# Patient Record
Sex: FEMALE | Race: WHITE
[De-identification: names, ages, dates, MRNs, and addresses within clinical notes are randomized per-mention and may not be internally consistent; named-entity substitution may affect disease eponyms.]

---

## 2017-07-08 ENCOUNTER — HOSPITAL ENCOUNTER (EMERGENCY)
Dept: HOSPITAL 25 - UCCORT | Age: 47
Discharge: HOME | End: 2017-07-08
Payer: COMMERCIAL

## 2017-07-08 VITALS — SYSTOLIC BLOOD PRESSURE: 147 MMHG | DIASTOLIC BLOOD PRESSURE: 87 MMHG

## 2017-07-08 DIAGNOSIS — I10: ICD-10-CM

## 2017-07-08 DIAGNOSIS — J02.9: Primary | ICD-10-CM

## 2017-07-08 DIAGNOSIS — L72.0: ICD-10-CM

## 2017-07-08 PROCEDURE — G0463 HOSPITAL OUTPT CLINIC VISIT: HCPCS

## 2017-07-08 PROCEDURE — 99211 OFF/OP EST MAY X REQ PHY/QHP: CPT

## 2017-07-08 NOTE — UC
Throat Pain/Nasal Rubén HPI





- History of Current Complaint


Chief Complaint: Liu


Stated Complaint: SORE THROAT/LUMP ON NECK


Time Seen by Provider: 07/08/17 14:37


Hx Obtained From: Patient


Hx Last Menstrual Period: 8/10/15


Onset/Duration: Sudden Onset - choking on food 2 days ago. Has a sore throat. 

Also has a lump on the right side of the neck., Still Present - still mild sore 

throat.


Severity: Mild


Cough: None


Associated Signs & Symptoms: Positive: Dysphagia, Hoarseness.  Negative: 

Wheezing, Fever


Related History: Seasonal Allergies





- Allergies/Home Medications


Allergies/Adverse Reactions: 


 Allergies











Allergy/AdvReac Type Severity Reaction Status Date / Time


 


No Known Allergies Allergy   Verified 07/08/17 14:39











Home Medications: 


 Home Medications





medroxyPROGESTERone TAB* [Provera TAB*] 10 mg PO SEE INSTRUCTIONS 07/08/17 [

History Confirmed 07/08/17]











PMH/Surg Hx/FS Hx/Imm Hx


Cardiovascular History: Hypertension





- Surgical History


Surgical History: Yes


Surgery Procedure, Year, and Place: d/c,deviated septum





- Family History


Known Family History: Positive: Cardiac Disease, Hypertension, Diabetes





- Social History


Occupation: Employed Full-time


Lives: With Family


Alcohol Use: None


Substance Use Type: None


Smoking Status (MU): Never Smoked Tobacco


Have You Smoked in the Last Year: No





- Immunization History


Most Recent Influenza Vaccination: none





Review of Systems


Skin: Other - skin nodule on the right neck.


ENT: Sore Throat


All Other Systems Reviewed And Are Negative: Yes





Physical Exam


Triage Information Reviewed: Yes


Appearance: Well-Appearing, No Pain Distress, Well-Nourished


Vital Signs: 


 Initial Vital Signs











Temp  98.8 F   07/08/17 14:36


 


Pulse  105   07/08/17 14:36


 


Resp  14   07/08/17 14:36


 


BP  147/87   07/08/17 14:36


 


Pulse Ox  99   07/08/17 14:36











Vital Signs Reviewed: Yes


Eyes: Positive: Conjunctiva Clear


ENT Exam: Normal


ENT: Positive: Nasal congestion - with allergic changes.


Neck exam: Normal


Respiratory Exam: Normal


Cardiovascular Exam: Normal


Musculoskeletal Exam: Normal


Neurological Exam: Normal


Psychological Exam: Normal


Skin: Positive: Other - 3mm cyst on the right anterior neck.





Throat Pain/Nasal Course/Dx





- Differential Dx/Diagnosis


Differential Diagnosis/HQI/PQRI: Mononucleosis, Tonsillitis, URI


Provider Diagnoses: Pharyngitis.  Skin cyst





Discharge





- Discharge Plan


Condition: Stable


Disposition: HOME


Patient Education Materials:  Pharyngitis (ED), Epidermal Inclusion Cysts (ED)


Additional Instructions: 


The cyst would only need to be addressed if it gets infected which will make it 

hot, red and swollen.


The sore throat from choking should gradually resolve. Soft diet.

## 2017-09-17 ENCOUNTER — HOSPITAL ENCOUNTER (EMERGENCY)
Dept: HOSPITAL 25 - UCEAST | Age: 47
Discharge: HOME | End: 2017-09-17
Payer: COMMERCIAL

## 2017-09-17 VITALS — SYSTOLIC BLOOD PRESSURE: 153 MMHG | DIASTOLIC BLOOD PRESSURE: 106 MMHG

## 2017-09-17 DIAGNOSIS — H57.8: Primary | ICD-10-CM

## 2017-09-17 PROCEDURE — 99212 OFFICE O/P EST SF 10 MIN: CPT

## 2017-09-17 PROCEDURE — G0463 HOSPITAL OUTPT CLINIC VISIT: HCPCS

## 2018-03-02 ENCOUNTER — HOSPITAL ENCOUNTER (EMERGENCY)
Dept: HOSPITAL 25 - UCEAST | Age: 48
Discharge: HOME | End: 2018-03-02
Payer: COMMERCIAL

## 2018-03-02 VITALS — DIASTOLIC BLOOD PRESSURE: 94 MMHG | SYSTOLIC BLOOD PRESSURE: 142 MMHG

## 2018-03-02 DIAGNOSIS — R03.0: ICD-10-CM

## 2018-03-02 DIAGNOSIS — J01.90: Primary | ICD-10-CM

## 2018-03-02 PROCEDURE — G0463 HOSPITAL OUTPT CLINIC VISIT: HCPCS

## 2018-03-02 PROCEDURE — 99212 OFFICE O/P EST SF 10 MIN: CPT

## 2018-03-02 NOTE — UC
Throat Pain/Nasal Rubén HPI





- HPI Summary


HPI Summary: 





Patient with complaints of worsening sinus congestion nasal drip pain in her 

face and in her nose has a history of deviated  septum with sinus surgery





- History of Current Complaint


Chief Complaint: UCGeneralIllness


Stated Complaint: SINUS AND CHEST CONGESTION


Time Seen by Provider: 03/02/18 14:35


Hx Obtained From: Patient


Hx Last Menstrual Period: 9/10/17


Pregnant?: No


Onset/Duration: Gradual Onset, Lasting Days - 3


Severity: Moderate


Pain Intensity: 2


Pain Scale Used: 0-10 Numeric


Cough: None - Right


Associated Signs & Symptoms: Positive: Sinus Discomfort, Nasal Discharge -  

adnexa Jongtom 3-year-old


Related History: Prior ENT Surgery





- Allergies/Home Medications


Allergies/Adverse Reactions: 


 Allergies











Allergy/AdvReac Type Severity Reaction Status Date / Time


 


No Known Allergies Allergy   Verified 03/02/18 14:19














PMH/Surg Hx/FS Hx/Imm Hx


Previously Healthy: Yes





- Surgical History


Surgical History: Yes


Surgery Procedure, Year, and Place: d/c.  deviated septum





- Family History


Known Family History: Positive: Cardiac Disease, Hypertension, Diabetes





- Social History


Occupation: Employed Full-time


Lives: With Family


Alcohol Use: None


Substance Use Type: None


Smoking Status (MU): Never Smoked Tobacco


Have You Smoked in the Last Year: No





- Immunization History


Most Recent Influenza Vaccination: none





Review of Systems


Constitutional: Negative - A couple of Trileptal


Skin: Negative


Eyes: Negative


ENT: Nasal Discharge, Sinus Congestion, Sinus Pain/Tenderness -  hopefully is


Respiratory: Negative


Cardiovascular: Negative


Gastrointestinal: Negative


Genitourinary: Negative


Motor: Negative


Neurovascular: Negative


Musculoskeletal: Negative


Neurological: Headache


Psychological: Negative


Is Patient Immunocompromised?: No


All Other Systems Reviewed And Are Negative: Yes





Physical Exam


Triage Information Reviewed: Yes


Appearance: Well-Appearing, No Pain Distress, Well-Nourished


Vital Signs: 


 Initial Vital Signs











Temp  98.7 F   03/02/18 14:21


 


Pulse  100   03/02/18 14:21


 


Resp  16   03/02/18 14:21


 


BP  142/94   03/02/18 14:21


 


Pulse Ox  99   03/02/18 14:21











Vital Signs Reviewed: Yes


Eye Exam: Normal


Eyes: Positive: Conjunctiva Clear


ENT Exam: Normal


ENT: Positive: Normal ENT inspection, Hearing grossly normal, Pharynx normal, 

Nasal congestion, Nasal drainage, TMs normal, Sinus tenderness, Uvula midline - 

O Any beta negative O timesa 100,001 uncomfortable culture will do a rapid 

strep here and.  Negative: Tonsillar swelling, Tonsillar exudate, Trismus, 

Muffled voice, Hoarse voice, Dental tenderness


Dental Exam: Normal


Neck exam: Normal


Neck: Positive: Supple, Nontender


Respiratory Exam: Normal


Respiratory: Positive: Chest non-tender, Lungs clear, Normal breath sounds, No 

respiratory distress, No accessory muscle use


Cardiovascular Exam: Normal


Cardiovascular: Positive: RRR, Pulses Normal, Brisk Capillary Refill


Musculoskeletal Exam: Normal


Musculoskeletal: Positive: Strength Intact, ROM Intact, No Edema


Neurological Exam: Normal


Neurological: Positive: Alert, Muscle Tone Normal


Psychological Exam: Normal


Skin Exam: Normal





Throat Pain/Nasal Course/Dx





- Course


Assessment/Plan: Patient states she started using over-the-counter remedies 

Sudafed and Mucinex patient states she is getting no relief patient states her 

PCP will usually Rx her Zithromax and she gets good effect with that she's 

hoping to have another sinus surgery patient requesting medication for cough at 

nighttime will Rx Robitussin and codeine blood pressures elevated without 

documented history of hypertension will follow with primary care in the next 1-

2 weeks





- Differential Dx/Diagnosis


Provider Diagnoses: Acute sinusitis elevated blood pressure without diagnosis 

of hypertension





Discharge





- Discharge Plan


Condition: Stable


Disposition: HOME


Prescriptions: 


Azithromycin TAB* [Zithromax TAB (Z-GROVER) 250 mg #6 tabs] 2 tab PO .TODAY, THEN 

1 DAILY #1 grover


guaiFENesin/CODIEN 100MG-10MG* [Robitussin AC 100Mg-10Mg*] 5 - 10 ml PO Q4H PRN 

#120 ml MDD 40


 PRN Reason: cough


Patient Education Materials:  Sinusitis (ED), Hypertension (ED)


Referrals: 


Jos Jesus MD [Primary Care Provider] - 2 Weeks

## 2018-04-30 ENCOUNTER — HOSPITAL ENCOUNTER (EMERGENCY)
Dept: HOSPITAL 25 - UCCORT | Age: 48
Discharge: LEFT BEFORE DISCHARGE | End: 2018-04-30
Payer: COMMERCIAL

## 2018-04-30 VITALS — DIASTOLIC BLOOD PRESSURE: 97 MMHG | SYSTOLIC BLOOD PRESSURE: 157 MMHG

## 2018-04-30 DIAGNOSIS — Z82.49: ICD-10-CM

## 2018-04-30 DIAGNOSIS — I10: ICD-10-CM

## 2018-04-30 DIAGNOSIS — R07.9: Primary | ICD-10-CM

## 2018-04-30 DIAGNOSIS — R06.02: ICD-10-CM

## 2018-04-30 PROCEDURE — G0463 HOSPITAL OUTPT CLINIC VISIT: HCPCS

## 2018-04-30 PROCEDURE — 99212 OFFICE O/P EST SF 10 MIN: CPT

## 2018-04-30 PROCEDURE — 93005 ELECTROCARDIOGRAM TRACING: CPT

## 2018-04-30 NOTE — ED
HPI Chest Pain





- HPI Summary


HPI Summary: 





48 yr old female with complaint of chest pain, sob, and left arm discomfort, 

tingling. Onset yesterday with the arm.  Today she feels SOB and has tightness 

in the chest.  She denies fever, chills, pleuritic pain.  She denies swelling 

or pain in the legs.  She denies dizziness, palpitations. 





She has a history of HTN, and family history of CAD. 





She has no other complaints.  





- History of Current Complaint


Chief Complaint: UCChestPain


Time Seen by Provider: 04/30/18 12:26


Hx Last Menstrual Period: 3/1/18


Pain Intensity: 3





- Allergy/Home Medications


Allergies/Adverse Reactions: 


 Allergies











Allergy/AdvReac Type Severity Reaction Status Date / Time


 


No Known Allergies Allergy   Verified 04/30/18 12:33














PMH/Surg Hx/FS Hx/Imm Hx


Endocrine/Hematology History: 


   Denies: Hx Diabetes, Hx Thyroid Disease


Cardiovascular History: Reports: Hx Hypertension - OCCASIONALLY HIGH BUT NO MEDS


   Denies: Hx Pacemaker/ICD


Respiratory History: 


   Denies: Hx Asthma, Hx Chronic Obstructive Pulmonary Disease (COPD)


GI History: 


   Denies: Hx Ulcer


 History: 


   Denies: Hx Renal Disease


Sensory History: 


   Denies: Hx Hearing Aid


Psychiatric History: 


   Denies: Hx Panic Disorder





- Cancer History


Hx Chemotherapy: No


Hx Radiation Therapy: No





- Surgical History


Surgery Procedure, Year, and Place: d/c.  deviated septum


Infectious Disease History: No


Infectious Disease History: 


   Denies: Hx Hepatitis, Hx Human Immunodeficiency Virus (HIV), Traveled 

Outside the [place] in Last 30 Days





- Family History


Known Family History: Positive: Cardiac Disease, Hypertension, Diabetes





- Social History


Alcohol Use: Rare


Substance Use Type: Reports: None


Smoking Status (MU): Never Smoked Tobacco


Have You Smoked in the Last Year: No





Review of Systems


Negative: Fever, Chills


Positive: Chest Pain


Positive: Shortness Of Breath


All Other Systems Reviewed And Are Negative: Yes





Physical Exam


Triage Information Reviewed: Yes


Vital Signs On Initial Exam: 


 Initial Vitals











Temp Pulse Resp BP Pulse Ox


 


 98.2 F   101   20   157/97   99 


 


 04/30/18 12:27  04/30/18 12:27  04/30/18 12:27  04/30/18 12:27  04/30/18 12:27











Vital Signs Reviewed: Yes


Appearance: Positive: Well-Appearing, No Pain Distress


Skin: Positive: Warm, Skin Color Reflects Adequate Perfusion


Head/Face: Positive: Normal Head/Face Inspection


Eyes: Positive: EOMI


ENT: Positive: Normal ENT inspection


Neck: Positive: Nontender


Respiratory/Lung Sounds: Positive: Clear to Auscultation, Breath Sounds Present


Cardiovascular: Positive: RRR.  Negative: Murmur


Abdomen Description: Positive: Nontender


Musculoskeletal: Positive: Strength/ROM Intact


Neurological: Positive: Sensory/Motor Intact, Alert, Oriented to Person Place, 

Time, CN Intact II-III


Psychiatric: Positive: Normal





- Eustace Coma Scale


Best Eye Response: 4 - Spontaneous


Best Motor Response: 6 - Obeys Commands


Best Verbal Response: 5 - Oriented


Coma Scale Total: 15





Diagnostics





- Vital Signs


 Vital Signs











  Temp Pulse Resp BP Pulse Ox


 


 04/30/18 12:27  98.2 F  101  20  157/97  99














- Laboratory


Lab Statement: Any lab studies that have been ordered have been reviewed, and 

results considered in the medical decision making process.





Chest Pain Course/Dx





- Course


Course Of Treatment: 48 yr old with chest pain, sob.  EKG ok.  I have advised 

her to go to the ER by ambulance for her symptoms.  She is signing out AMA with 

risk of heart attack, death and disability understood.





- Diagnoses


Provider Diagnoses: 


 Chest pain, Shortness of breath, Hypertension








Discharge





- Sign-Out/Discharge


Documenting (check all that apply): Discharge/Admit/Transfer





- Discharge Plan


Condition: Good


Disposition: AGAINST MEDICAL ADVICE


Referrals: 


Jos Jesus MD [Primary Care Provider] - 





- Billing Disposition and Condition


Condition: GOOD


Disposition: AMA

## 2018-11-30 ENCOUNTER — HOSPITAL ENCOUNTER (EMERGENCY)
Dept: HOSPITAL 25 - UCEAST | Age: 48
Discharge: HOME | End: 2018-11-30
Payer: COMMERCIAL

## 2018-11-30 VITALS — DIASTOLIC BLOOD PRESSURE: 90 MMHG | SYSTOLIC BLOOD PRESSURE: 170 MMHG

## 2018-11-30 DIAGNOSIS — H10.31: ICD-10-CM

## 2018-11-30 DIAGNOSIS — J32.9: Primary | ICD-10-CM

## 2018-11-30 DIAGNOSIS — I10: ICD-10-CM

## 2018-11-30 PROCEDURE — 99212 OFFICE O/P EST SF 10 MIN: CPT

## 2018-11-30 PROCEDURE — G0463 HOSPITAL OUTPT CLINIC VISIT: HCPCS

## 2018-11-30 NOTE — UC
Respiratory Complaint HPI





- HPI Summary


HPI Summary: 





49 y/o female presents to the urgent care c/o nasal congestion, green nasal 

discharge, a lot of PND for the past 10 days. Pt states Hx of recurrent 

sinusitis in the past. She saw her PCP on 11/27/2018 and was Rx Z-emelia which 

usually resolves it. However this time symptoms are worsening. She woke uo this 

morning w/ her RT eye red and some mild crusting discharge. the dry cough is 

not allowing her to sleep well since she  has been mouth breathing too. She 

took last dose ot ABx today. Pt denies fever, SOB, chest pain, HA, dizziness, 

abdominal pain, N/V/D. She felt some mild wheezing last night, which resolved 

today. 








- History of Current Complaint


Chief Complaint: UCRespiratory


Stated Complaint: SINUS CONGESTION


Time Seen by Provider: 11/30/18 20:11


Hx Obtained From: Patient


Hx Last Menstrual Period: 1 MONTH AGO


Pregnant?: No


Onset/Duration: Gradual Onset, Lasting Weeks - 1 week, Still Present, Worse 

Since - 2 days


Timing: Intermittent Episodes


Severity Initially: Mild


Severity Currently: Moderate


Pain Intensity: 7 - sinus pain


Pain Scale Used: 0-10 Numeric


Character: Cough: Nonproductive


Aggravating Factors: Recumbent Position


Alleviating Factors: OTC Meds


Associated Signs And Symptoms: Positive: Wheezing - yesterday, but resolved 

today, URI, Nasal Congestion, Sinus Discomfort.  Negative: Fever, Chills





- Allergies/Home Medications


Allergies/Adverse Reactions: 


 Allergies











Allergy/AdvReac Type Severity Reaction Status Date / Time


 


No Known Allergies Allergy   Verified 11/30/18 19:20














PMH/Surg Hx/FS Hx/Imm Hx


Previously Healthy: Yes


Cardiovascular History: Hypertension - diet controlled





- Surgical History


Surgical History: Yes


Surgery Procedure, Year, and Place: d/c.  deviated septum





- Family History


Known Family History: Positive: Cardiac Disease, Hypertension, Diabetes





- Social History


Occupation: Employed Full-time


Lives: With Family


Alcohol Use: Occasionally


Substance Use Type: None


Smoking Status (MU): Never Smoked Tobacco


Have You Smoked in the Last Year: No





- Immunization History


Most Recent Influenza Vaccination: none





Review of Systems


All Other Systems Reviewed And Are Negative: Yes


Constitutional: Positive: Negative


Skin: Positive: Negative


Eyes: Positive: Negative


ENT: Positive: Ear Ache - b/L ear pressure, Nasal Discharge, Sinus Congestion, 

Sinus Pain/Tenderness, Other - +PND


Respiratory: Positive: Cough - dry


Cardiovascular: Positive: Negative


Gastrointestinal: Positive: Negative


Genitourinary: Positive: Negative


Motor: Positive: Negative


Neurovascular: Positive: Negative


Musculoskeletal: Positive: Negative


Neurological: Positive: Negative


Psychological: Positive: Negative


Is Patient Immunocompromised?: No





Physical Exam





- Summary


Physical Exam Summary: 





Vitals: reviewed


General: Well developed, well-nourished obese female patient with NAD.


Head and face: Normocephalic and atraumatic, Positive tenderness over the 

frontal and maxillary sinuses..


Eyes: Rt conjunctiva inflamed. w/ mild yellowish discharge. PERRLA,  EOMI 

intact w/out limitation or complaint of pain.  eyelashes clear. mild tearing 

and No ciliary flush.  No chemosis, No photophobia.  Normal fundoscopic exam; 

no proptosis, exophthalmos, nystagmus.


ENT: Ears and TM with normal limits. 


Nose: edematous and erythematous nasal mucosa with  with yellowish discharge 

and erythematous mucosa. Pharynx with erythema, no exudate. +moderate yelowish 

PND


Neck: Supple, no JVD, no carotid bruits and no lymphadenopathy.


Lungs: clear, no rales, no rhonchi, no wheezes.


CVS: RRR, S1 and S2 present no murmurs or gallops appreciated.


Abdomen: soft nontender with positive bowel sounds.


Extremities: no edema noted.


Neuro: WNL. 


Skin: warm and dry





Triage Information Reviewed: Yes


Vital Signs: 


 Initial Vital Signs











Temp  98.7 F   11/30/18 19:16


 


Pulse  109   11/30/18 19:16


 


Resp  16   11/30/18 19:16


 


BP  164/109   11/30/18 19:16


 


Pulse Ox  99   11/30/18 19:16














 Diagnostic Evaluation





- Laboratory


O2 Sat by Pulse Oximetry: 99





Respiratory Course/Dx





- Course


Course Of Treatment: 49 y/o female presents to the urgent care c/o nasal 

congestion, green nasal discharge, a lot of PND for the past 10 days. Pt states 

Hx of recurrent sinusitis in the past. She saw her PCP on 11/27/2018 and was Rx 

Z-emelia which usually resolves it. However this time symptoms are worsening. She 

woke uo this morning w/ her RT eye red and some mild crusting discharge. the 

dry cough is not allowing her to sleep well since hse has been mouth breathing 

too. She took last dose ot ABx today. Pt denies fever, SOB, chest pain, HA, 

dizziness, abdominal pain, N/V/D. She felt some mild wheezing last night, which 

resolved today. Hx obtained. Pt w/ acute bacterial sinusitis and Rt eye 

conjunctivitis on examiantion. Also Pt's BP is elevated. Manually 170/98. Pt 

states she was recently Dx w/ w/ HTN, but she is trying diet control first. Pt 

is hemodynamically stable, A&OX3 and denies any blurred vision, dizziness or 

chest pain at this moment. Pt with 1 week of symptoms getting worse. Pt Rx 

Augmentin PO and flonase nasal spray for her sinusitis. Also Rx ofloxacin 

opthalmic drops  to alleviate conjunctivitis. Discharge instructions explained 

to Pt. Advised to Return to the clinic or  PCP if symptoms do not improve.Pt's 

BP is elevated today, However she is asymptomatic. Strogly advised to go to the 

ER if she develops ahy dizziness, HA or visual disturbances. Also  advised to 

decrease salt in diet, monitor BP and f/u with PCP for further management.  Pt 

understood and agreed with plan of care.





- Differential Dx/Diagnosis


Differential Diagnosis/HQI/PQRI: Asthma, Bronchitis, Influenza, Laryngitis, 

Sinusitis


Provider Diagnosis: 


 Sinusitis, Conjunctivitis, Uncontrolled hypertension








Discharge





- Sign-Out/Discharge


Documenting (check all that apply): Patient Departure - d/c home


All imaging exams completed and their final reports reviewed: No Studies





- Discharge Plan


Condition: Stable


Disposition: HOME


Prescriptions: 


Amoxicillin/Clavulanate TAB* [Augmentin *] 875 mg PO BID #19 tab


Fluticasone NASAL SPRAY 50MCG* [Flonase NASAL SPRAY 50MCG*] 2 spray BOTH NARES 

DAILY #1 btl


LoraTADine TAB(NF) [Claritin 10 MG TAB(NF)] 10 mg PO DAILY #30 tab


Ofloxacin 0.3% (Eye Drop) [Ocuflox OPTH 0.3% (Eye Drop)] 1 - 2 drop RIGHT EYE 

Q4H #1 btl


Patient Education Materials:  Sinusitis (ED), Low-Sodium Diet (ED), 

Conjunctivitis (ED)


Referrals: 


Jos Jesus MD [Primary Care Provider] - 3 Days


Additional Instructions: 


1- Please increase fluid intake and rest. take full course of antibiotic to 

avoid resistance. First dose given tonight 


2-Use Flonase as directed to help drain fluid. Also buy saline drops to clear 

sinuses


3-Take Loratadine PO to alleviates sinus congestion


4-Return to the clinic or  PCP in 3 days if symptoms do not improve for further 

management and treatment


5- Your BP is elevated today. You are asymptomatic now. However if your develop 

severe HA, dizziness or visual disturbances pleas go immediately to the ER for 

further management.   Otherwise, please decrease salt in your diet, monitor BP 

and if it continues to be elevated please f/u with your PCP for further 

management

















- Billing Disposition and Condition


Condition: STABLE


Disposition: Home

## 2019-03-24 ENCOUNTER — HOSPITAL ENCOUNTER (EMERGENCY)
Dept: HOSPITAL 25 - ED | Age: 49
Discharge: HOME | End: 2019-03-24
Payer: COMMERCIAL

## 2019-03-24 VITALS — DIASTOLIC BLOOD PRESSURE: 92 MMHG | SYSTOLIC BLOOD PRESSURE: 142 MMHG

## 2019-03-24 DIAGNOSIS — Z79.899: ICD-10-CM

## 2019-03-24 DIAGNOSIS — I10: ICD-10-CM

## 2019-03-24 DIAGNOSIS — M54.16: Primary | ICD-10-CM

## 2019-03-24 LAB
ALBUMIN SERPL BCG-MCNC: 4.1 G/DL (ref 3.2–5.2)
ALBUMIN/GLOB SERPL: 1.3 {RATIO} (ref 1–3)
ALP SERPL-CCNC: 65 U/L (ref 34–104)
ALT SERPL W P-5'-P-CCNC: 18 U/L (ref 7–52)
ANION GAP SERPL CALC-SCNC: 6 MMOL/L (ref 2–11)
AST SERPL-CCNC: 16 U/L (ref 13–39)
BASOPHILS # BLD AUTO: 0 10^3/UL (ref 0–0.2)
BUN SERPL-MCNC: 14 MG/DL (ref 6–24)
BUN/CREAT SERPL: 24.6 (ref 8–20)
CALCIUM SERPL-MCNC: 9.3 MG/DL (ref 8.6–10.3)
CHLORIDE SERPL-SCNC: 105 MMOL/L (ref 101–111)
EOSINOPHIL # BLD AUTO: 0.3 10^3/UL (ref 0–0.6)
GLOBULIN SER CALC-MCNC: 3.2 G/DL (ref 2–4)
GLUCOSE SERPL-MCNC: 103 MG/DL (ref 70–100)
HCO3 SERPL-SCNC: 26 MMOL/L (ref 22–32)
HCT VFR BLD AUTO: 38 % (ref 33–41)
HGB BLD-MCNC: 12.9 G/DL (ref 12–16)
LYMPHOCYTES # BLD AUTO: 2 10^3/UL (ref 1–4.8)
MCH RBC QN AUTO: 31 PG (ref 27–31)
MCHC RBC AUTO-ENTMCNC: 34 G/DL (ref 31–36)
MCV RBC AUTO: 90 FL (ref 80–97)
MONOCYTES # BLD AUTO: 0.5 10^3/UL (ref 0–0.8)
NEUTROPHILS # BLD AUTO: 3.6 10^3/UL (ref 1.5–7.7)
NRBC # BLD AUTO: 0 10^3/UL
NRBC BLD QL AUTO: 0
PLATELET # BLD AUTO: 344 10^3/UL (ref 150–450)
POTASSIUM SERPL-SCNC: 4.5 MMOL/L (ref 3.5–5)
PROT SERPL-MCNC: 7.3 G/DL (ref 6.4–8.9)
RBC # BLD AUTO: 4.2 10^6 /UL (ref 3.7–4.87)
SODIUM SERPL-SCNC: 137 MMOL/L (ref 135–145)
WBC # BLD AUTO: 6.4 10^3/UL (ref 3.5–10.8)

## 2019-03-24 PROCEDURE — 36415 COLL VENOUS BLD VENIPUNCTURE: CPT

## 2019-03-24 PROCEDURE — 80053 COMPREHEN METABOLIC PANEL: CPT

## 2019-03-24 PROCEDURE — 99283 EMERGENCY DEPT VISIT LOW MDM: CPT

## 2019-03-24 PROCEDURE — 85379 FIBRIN DEGRADATION QUANT: CPT

## 2019-03-24 PROCEDURE — 86140 C-REACTIVE PROTEIN: CPT

## 2019-03-24 PROCEDURE — 85025 COMPLETE CBC W/AUTO DIFF WBC: CPT

## 2019-03-24 NOTE — ED
GI/ HPI





- HPI Summary


HPI Summary: 





A 50 y/o F presents to ED s/p partial hysterectomy on 02/26/19 with c/o R groin 

pain onset five days ago and worsening severely two days ago. She had a follow-

up with her surgeon, Dr. Richardson, on 03/18 and things were OK, but the following 

day, she felt a pain in her groin. The pain radiates to her back and R thigh 

which is new onset today. Aggravating factors: nighttime, turning, movement. 

Associated sx: mild SOB, which resolves with her albuterol inhaler. Denies: 

pedal edema, urinary sx, she's having frequency per baseline. She sees Dr. Baez

, urology. Her hysterectomy was done by Dr. Richardson in [place]. She denies any 

heavy lifting and has been taking it easy since her surgery. 








- History of Current Complaint


Chief Complaint: EDAbdPain


Time Seen by Provider: 03/24/19 15:07


Stated Complaint: GROIN PAIN PER PT


Hx Obtained From: Patient


Hx Last Menstrual Period: 1 MONTH AGO


Onset/Duration: Started Days Ago, Still Present


Timing: Constant


Severity: Mild


Current Severity: Moderate


Pain Intensity: 5 - out of 10


Location of Pain: Groin


Pain Characteristics: Aching


Pain Radiates to: Back


Associated Signs and Symptoms: Positive: Back Pain, Other: - pos: mild SOB, neg

: pedal edema.  Negative: Hematuria, Dysuria


Aggravating Factor(s): Movement





- Allergy/Home Medications


Allergies/Adverse Reactions: 


 Allergies











Allergy/AdvReac Type Severity Reaction Status Date / Time


 


No Known Allergies Allergy   Verified 03/24/19 14:52











Home Medications: 


 Home Medications





amLODIPine TAB* [Norvasc 5 mg TAB*] 5 mg PO DAILY 03/24/19 [History Confirmed 03 /24/19]











PMH/Surg Hx/FS Hx/Imm Hx


Previously Healthy: No


Endocrine/Hematology History: 


   Denies: Hx Diabetes, Hx Thyroid Disease


Cardiovascular History: Reports: Hx Hypertension


   Denies: Hx Pacemaker/ICD


Respiratory History: 


   Denies: Hx Asthma, Hx Chronic Obstructive Pulmonary Disease (COPD)


GI History: 


   Denies: Hx Ulcer


 History: 


   Denies: Hx Renal Disease


Sensory History: 


   Denies: Hx Hearing Aid


Psychiatric History: 


   Denies: Hx Panic Disorder





- Cancer History


Hx Chemotherapy: No


Hx Radiation Therapy: No





- Surgical History


Surgery Procedure, Year, and Place: d/c.  deviated septum


Infectious Disease History: No


Infectious Disease History: 


   Denies: Hx Hepatitis, Hx Human Immunodeficiency Virus (HIV), Traveled 

Outside the [place] in Last 30 Days





- Family History


Known Family History: Positive: Cardiac Disease, Hypertension, Diabetes





- Social History


Occupation: Employed Full-time


Lives: With Family


Alcohol Use: Occasionally


Substance Use Type: Reports: None


Smoking Status (MU): Never Smoked Tobacco


Have You Smoked in the Last Year: No





Review of Systems


Negative: Fever


Positive: Shortness Of Breath - mild SOB


Positive: pain - R groin pain radiating to R thigh.  Negative: dysuria, 

hematuria


Musculoskeletal: Other - pos: back pain


Negative: Edema


All Other Systems Reviewed And Are Negative: Yes





Physical Exam





- Summary


Physical Exam Summary: 





Appearance: The patient is well-nourished in no acute distress and in no acute 

pain.





Skin: The skin is warm and dry and skin color reflects adequate perfusion.





HEENT:  The head is normocephalic and atraumatic. The pupils are equal and 

reactive. The conjunctivae are clear and without drainage.  Nares are patent 

and without drainage.  Mouth reveals moist mucous membranes and the throat is 

without erythema and exudate.  The external ears are intact. The ear canals are 

patent and without drainage. The tympanic membranes are intact.





Neck: the neck is supple with full range of motion and non-tender. There are no 

carotid bruits.  There is no neck vein distension.





Respiratory: Chest is non-tender.  Lungs are clear to auscultation and breath 

sounds are symmetrical and equal.





Cardiovascular: Heart is regular rate and rhythm.  There is no murmur or rub 

auscultated.   There is no peripheral edema and pulses are symmetrical and 

equal.





Abdomen: The abdomen is soft and non-tender.  There are normal bowel sounds 

heard in all four quadrants and there is no organomegaly palpated.  Non-tender 

in R inguinal and common femoral canal.  





Musculoskeletal: Extremities are non-tender with full range of motion.  There 

is good capillary refill.  There is no peripheral edema or calf tenderness 

elicited.  Tenderness in R para-upper lumbar area.  Positive straight leg raise 

on R.  





Neurological: Patient is alert and oriented to person, place and time.  The 

patient has symmetrical motor strength in all four extremities.  Cranial nerves 

are grossly intact. Deep tendon reflexes are symmetrical and equal in all four 

extremities.





Psychiatric: The patient has an appropriate affect and does not exhibit any 

anxiety or depression.





Triage Information Reviewed: Yes


Vital Signs On Initial Exam: 


 Initial Vitals











Temp Pulse Resp BP Pulse Ox


 


 98.6 F   103   22   154/108   98 


 


 03/24/19 14:47  03/24/19 14:47  03/24/19 14:47  03/24/19 14:47  03/24/19 14:47











Vital Signs Reviewed: Yes





Diagnostics





- Vital Signs


 Vital Signs











  Temp Pulse Resp BP Pulse Ox


 


 03/24/19 14:47  98.6 F  103  22  154/108  98














- Laboratory


Result Diagrams: 


 03/24/19 15:33





 03/24/19 15:33


Lab Statement: Any lab studies that have been ordered have been reviewed, and 

results considered in the medical decision making process.





GIGU Course/Dx





- Course


Course Of Treatment: Ms. Franco presented complaining of right inguinal pain.  

It's worse when she moves.  She has had no nausea/vomiting, change in bowels or 

bladder or vaginal discharge/bleeding.  She had a hysterectomy about a month 

ago.  She was doing fine for a couple of weeks and then started to have this 

pain that has gradually worsened.  She was nontoxic in appearance with stable 

vital signs.  Her abdomen was soft and nontender.  She had no tenderness in the 

common femoral canal on the right or in the inguinal region.  She was tender in 

the paralumbar area on the right around L1-L2.  She had a positive straight leg 

raise on the right reproducing her symptoms.  She has a vague complaint of some 

mild shortness of breath very transiently a couple of times.  She has been 

moving around some to walk the dog.  Labs were obtained and were unremarkable 

aside from a marginally elevated d-dimer at 249.  Clinically she seems to have 

a lumbar radiculopathy and there is no evidence that she has any infection or 

blood clot.  I recommended symptomatic treatment and follow-up this week.





- Diagnoses


Provider Diagnoses: 


 Lumbar radiculopathy








Discharge





- Sign-Out/Discharge


Documenting (check all that apply): Patient Departure - D/C


Patient Received Moderate/Deep Sedation with Procedure: No





- Discharge Plan


Condition: Stable


Disposition: HOME


Prescriptions: 


traMADol TAB* [Ultram*] 50 mg PO Q6HR PRN #20 tab MDD 4


 PRN Reason: Pain


Patient Education Materials:  Tramadol (By mouth), Lumbar Radiculopathy (ED)


Referrals: 


Jhoana Richardson MD [Medical Doctor] - 1 Week


Jos Jesus MD [Primary Care Provider] - 3 Days


Additional Instructions: 


Follow up with Dr. Richardson within the next week.





Please return to the ED if you experience new or worsening symptoms. Follow up 

with your primary care provider in 2-3 days. 











- Billing Disposition and Condition


Condition: STABLE


Disposition: Home





- Attestation Statements


Document Initiated by Scribe: Yes


Documenting Scribe: Sanchez Morton


Provider For Whom Maryibruel is Documenting (Include Credential): Dr. Elder Rodriguez MD


Scribe Attestation: 


I, Sanchez Morton, scribed for Dr. Elder Rodriguez MD on 03/24/19 at 2037. 


Scribe Documentation Reviewed: Yes


Provider Attestation: 


The documentation as recorded by the Sanchez mike accurately 

reflects the service I personally performed and the decisions made by me, Dr. Elder Rodriguez MD


Status of Scribe Document: Viewed

## 2019-09-11 NOTE — UC
Eye Complaint HPI





- HPI Summary


HPI Summary: 





TWO DAYS OF RIGHT EYE FLOATERS, "FEELS LIKE SOMEONE IS DRAWING ACROSS MY FIELD 

OF VISION WITH A BLACK PERMANENT MARKER'. NO TRAUMA. NO BLURRINESS. NO FEVER. 

NO EYE REDNESS OR IRRITATION. NO PAIN WITH EYE MOVEMENT. 





- History of Current Complaint


Chief Complaint: UCEye


Stated Complaint: EYE


Time Seen by Provider: 09/17/17 12:41


Hx Obtained From: Patient


Hx Last Menstrual Period: 9/10/17


Onset/Duration: Sudden Onset, Lasting Days, Still Present


Timing: Intermittent Episode Lasting


Severity Initially: Mild


Severity Currently: Moderate


Location of Injury: Other - FIELD OF VISION RIGHT EYE


Aggravating Factor(s): Nothing


Alleviating Factor(s): Nothing


Associated Signs And Symptoms: Positive: Vision Impairment Right - FLOATERS, 

MOLLY VOLANTE.  Negative: Drainage (Clear), Drainage (Purulent)





- Risk Factors


Penetrating Injury Risk Factor: Negative


Acute Glaucoma Risk Factors: Negative





- Allergies/Home Medications


Allergies/Adverse Reactions: 


 Allergies











Allergy/AdvReac Type Severity Reaction Status Date / Time


 


No Known Allergies Allergy   Verified 09/17/17 12:24














PMH/Surg Hx/FS Hx/Imm Hx


Previously Healthy: Yes





- Surgical History


Surgical History: Yes


Surgery Procedure, Year, and Place: d/c,deviated septum





- Family History


Known Family History: Positive: Cardiac Disease, Hypertension, Diabetes





- Social History


Occupation: Employed Full-time


Lives: With Family


Alcohol Use: None


Substance Use Type: None


Smoking Status (MU): Never Smoked Tobacco


Have You Smoked in the Last Year: No





- Immunization History


Most Recent Influenza Vaccination: none





Review of Systems


Constitutional: Negative


Skin: Negative


Eyes: Other - MOLLY VOLANTE RIGHT EYE


ENT: Negative


Respiratory: Negative


Cardiovascular: Negative


Gastrointestinal: Negative


Genitourinary: Negative


Motor: Negative


Neurovascular: Negative


Musculoskeletal: Negative


Neurological: Negative


Psychological: Negative


Is Patient Immunocompromised?: No


All Other Systems Reviewed And Are Negative: Yes





Physical Exam


Triage Information Reviewed: Yes


Appearance: Well-Appearing, No Pain Distress, Well-Nourished


Vital Signs: 


 Initial Vital Signs











Temp  98.6 F   09/17/17 12:19


 


Pulse  94   09/17/17 12:19


 


Resp  18   09/17/17 12:19


 


BP  153/106   09/17/17 12:19


 


Pulse Ox  99   09/17/17 12:19











Vital Signs Reviewed: Yes


Eyes: Positive: Conjunctiva Clear, Other: - PERRLA; NO RETINAL ABNORMALITIES 

OBSERVED..  Negative: Discharge


ENT Exam: Normal


ENT: Positive: Normal ENT inspection, Hearing grossly normal, Pharynx normal, 

TMs normal


Dental Exam: Normal


Neck exam: Normal


Neck: Positive: Supple, Nontender, No Lymphadenopathy


Respiratory Exam: Normal


Respiratory: Positive: Chest non-tender, Lungs clear, Normal breath sounds, No 

respiratory distress, No accessory muscle use


Cardiovascular Exam: Normal


Cardiovascular: Positive: RRR, No Murmur, Pulses Normal


Abdominal Exam: Normal


Musculoskeletal Exam: Normal


Musculoskeletal: Positive: Strength Intact, ROM Intact


Neurological Exam: Normal


Psychological Exam: Normal


Skin Exam: Normal





Eye Complaint Course/Dx





- Course


Course Of Treatment: CALLED [facility] ED SPOKE TO DR REDDING. PATIENT ELECTED TO 

GO TO [facility] BY PRIVATE CAR.





- Differential Dx/Diagnosis


Differential Diagnosis/HQI/PQRI: Detached Retina, Glaucoma, Retinal Artery 

Occlusion


Provider Diagnoses: RIGHT EYE MOLLY VOLANTE; POSSIBLE RETINAL DETACHEMENT.





Discharge





- Discharge Plan


Condition: Stable


Disposition: OTHER


Discharge Disposition Comment: ADVISED TO GO TO [facility] ED, REFUSED AMBULANCE


Patient Education Materials:  Visual Floaters (ED)


Referrals: 


Jos Jesus MD [Primary Care Provider] - 


Additional Instructions: 


 YOU HAVE REFUSED THE OFFER OF AMBULANCE TRANSPORT AND HAVE ELECTED TO GO TO 

THE EMERGENCY DEPARTMENT BY PRIVATE CAR.


YOU ARE ADVISED TO PROCEED SAFELY, BUT DIRECTLY TO THE EMERGENCY DEPARTMENT (

[facility] EMERGENCY DEPARTMENT) FOR CONTINUED EVALUATION.
left hip osteoarthritis

## 2020-01-01 ENCOUNTER — HOSPITAL ENCOUNTER (EMERGENCY)
Dept: HOSPITAL 25 - UCCORT | Age: 50
Discharge: HOME | End: 2020-01-01
Payer: COMMERCIAL

## 2020-01-01 VITALS — SYSTOLIC BLOOD PRESSURE: 157 MMHG | DIASTOLIC BLOOD PRESSURE: 93 MMHG

## 2020-01-01 DIAGNOSIS — I10: ICD-10-CM

## 2020-01-01 DIAGNOSIS — K08.89: Primary | ICD-10-CM

## 2020-01-01 DIAGNOSIS — J32.9: ICD-10-CM

## 2020-01-01 PROCEDURE — 99212 OFFICE O/P EST SF 10 MIN: CPT

## 2020-01-01 PROCEDURE — G0463 HOSPITAL OUTPT CLINIC VISIT: HCPCS

## 2020-01-01 NOTE — UC
Throat Pain/Nasal Rubén HPI





- HPI Summary


HPI Summary: 





50 yo female presents here with two issues





She has a painful broken right upper molar that occurred a few days ago





She also has about a 36 hour hx of sinus pressure and pain as well as post 

nasal drip





no fever or chills


no myalgias


+ facial pressure and pain











- History of Current Complaint


Chief Complaint: UCGeneralIllness


Stated Complaint: SINUS


Time Seen by Provider: 01/01/20 21:47


Hx Obtained From: Patient


Hx Last Menstrual Period: hysterectomy


Onset/Duration: Sudden Onset - tooth, Gradual Onset - sinus, Lasting Days


Severity: Moderate


Pain Intensity: 7


Pain Scale Used: 0-10 Numeric


Cough: Nonproductive


Associated Signs & Symptoms: Positive: Sinus Discomfort, Nasal Discharge





- Epiglottits Risk Factors


Epiglottis Risk Factors: Negative





- Allergies/Home Medications


Allergies/Adverse Reactions: 


 Allergies











Allergy/AdvReac Type Severity Reaction Status Date / Time


 


No Known Allergies Allergy   Verified 01/01/20 21:45











Home Medications: 


 Home Medications





Acetaminophen [Tylenol] 2 tab PO ONCE 01/01/20 [History Confirmed 01/01/20]











PMH/Surg Hx/FS Hx/Imm Hx


Previously Healthy: Yes


Endocrine History: Dyslipidemia


Cardiovascular History: Hypertension





- Surgical History


Surgical History: Yes


Surgery Procedure, Year, and Place: d/c.  deviated septum.  hysterectomy May 

2019





- Family History


Known Family History: Positive: Cardiac Disease, Hypertension, Diabetes





- Social History


Alcohol Use: Occasionally


Substance Use Type: None


Smoking Status (MU): Never Smoked Tobacco


Have You Smoked in the Last Year: No





- Immunization History


Most Recent Influenza Vaccination: none





Review of Systems


All Other Systems Reviewed And Are Negative: Yes


Constitutional: Positive: Negative


Skin: Positive: Negative


Eyes: Positive: Negative


ENT: Positive: Dental Pain, Sinus Congestion, Sinus Pain/Tenderness


Respiratory: Positive: Cough


Cardiovascular: Positive: Negative


Gastrointestinal: Positive: Negative


Genitourinary: Positive: Negative


Motor: Positive: Negative


Neurovascular: Positive: Negative


Musculoskeletal: Positive: Negative


Neurological: Positive: Negative


Psychological: Positive: Negative





Physical Exam


Triage Information Reviewed: Yes


Appearance: Well-Appearing, No Pain Distress, Well-Nourished


Vital Signs: 


 Initial Vital Signs











Temp  98.4 F   01/01/20 21:46


 


Pulse  107   01/01/20 21:46


 


Resp  18   01/01/20 21:46


 


BP  157/93   01/01/20 21:46


 


Pulse Ox  99   01/01/20 21:46











Vital Signs Reviewed: Yes


Eyes: Positive: Conjunctiva Clear


ENT: Positive: Hearing grossly normal, Nasal congestion, Nasal drainage, TMs 

normal, Hoarse voice, Dental tenderness, Sinus tenderness, Uvula midline.  

Negative: Tonsillar swelling, Tonsillar exudate, Trismus, Muffled voice


Dental Exam: Other - see image


Neck: Positive: Supple, Nontender, No Lymphadenopathy


Respiratory: Positive: Lungs clear, Normal breath sounds, No respiratory 

distress, No accessory muscle use


Cardiovascular: Positive: RRR, No Murmur


Musculoskeletal: Positive: ROM Intact, No Edema


Neurological: Positive: Alert


Psychological Exam: Normal


Skin Exam: Normal





Images


Dental: 


  __________________________














  __________________________





 1 - fractured tooth





 2 - swollen gum








Throat Pain/Nasal Course/Dx





- Differential Dx/Diagnosis


Provider Diagnosis: 


 Rhinosinusitis, Dentalgia








Discharge ED





- Sign-Out/Discharge


Documenting (check all that apply): Patient Departure


All imaging exams completed and their final reports reviewed: No Studies





- Discharge Plan


Condition: Stable


Disposition: HOME


Prescriptions: 


Amoxicillin PO (*) [Amoxicillin 875 MG (*)] 875 mg PO BID #14 tab


Fluticasone NASAL SPRAY 50MCG* [Flonase NASAL SPRAY 50MCG*] 2 spray BOTH NARES 

BID #1 btl


Patient Education Materials:  Rhinosinusitis (ED), Toothache (ED)


Referrals: 


Jos Jesus MD [Primary Care Provider] - 1 Week


Additional Instructions: 


see your dentist ASAP





saline sinus spray twice daily 














- Billing Disposition and Condition


Condition: STABLE


Disposition: Home

## 2020-04-13 ENCOUNTER — HOSPITAL ENCOUNTER (EMERGENCY)
Dept: HOSPITAL 25 - UCCORT | Age: 50
Discharge: HOME | End: 2020-04-13
Payer: COMMERCIAL

## 2020-04-13 VITALS — SYSTOLIC BLOOD PRESSURE: 143 MMHG | DIASTOLIC BLOOD PRESSURE: 96 MMHG
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
## 2020-04-13 DIAGNOSIS — R07.81: Primary | ICD-10-CM
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
## 2020-04-13 DIAGNOSIS — S80.211A: ICD-10-CM

## 2020-04-13 PROCEDURE — G0463 HOSPITAL OUTPT CLINIC VISIT: HCPCS

## 2020-04-13 PROCEDURE — 99212 OFFICE O/P EST SF 10 MIN: CPT

## 2020-04-13 NOTE — UC
Minor Trauma HPI





- HPI Summary


HPI Summary: 





Pt evaluated during COVID- pandemic- pt denies cough, sob, sore throat, fever

, recent illness, contact with PUI/COVID + person





Patient is a 50-year-old female presents to urgent care for evaluation of right-

sided rib pain.  Patient states 2 days ago she went on a deck that was 

slippery.  Patient states she slipped and fell landing on her right ribs in the 

flower bed.  Patient strike her head.  No loss of consciousness.  No neck or 

back pain.  Patient with small abrasion to her right knee and pain along her 

right ribs.  Patient also reports bruising along the proximal part of her right 

leg.  Patient states pain in the ribs is not better rest but when she takes a 

deep breath, moves, touches it, rolls over in bed and wakes her up.  Patient 

denies shortness of breath.  No nausea or vomiting.  No abdominal pain.  No 

hematuria.  Patient has been taking Tylenol every 8 hours with little relief.  

Has not applied ice or heat.  Patient states she does not know when her last 

tetanus was but thinks it was less than 10 years..  Patient without any 

weakness or paresthesias to her ankle and foot.





Pts medications as entered in the EMR by triage RN reviewed this visit





- History of Current Complaint


Chief Complaint: UCTrauma


Stated Complaint: SP FALL-RT SIDE RIB PAIN


Time Seen by Provider: 20 13:00


Hx Obtained From: Patient, Other: -  Michelle FEDERICO Sanchez | Reference #: [id number]  

ISTOP


Hx Last Menstrual Period: hysterectomy


Onset/Duration: Sudden Onset


Onset Of Pain: Post Accident


Severity Initially: Moderate


Severity Currently: Moderate


Pain Intensity: 7


Pain Scale Used: 0-10 Numeric





- Allergies/Home Medications


Allergies/Adverse Reactions: 


 Allergies











Allergy/AdvReac Type Severity Reaction Status Date / Time


 


No Known Allergies Allergy   Verified 20 13:03











Home Medications: 


 Home Medications





amLODIPine TAB* [Norvasc 5 mg TAB*] 5 mg PO DAILY 19 [History Confirmed ]


Cholecalciferol TAB* [Vitamin D TAB*] 2,000 unit PO DAILY 10/04/19 [History 

Confirmed 20]


Citalopram Hydrobromide [Celexa] 20 mg PO BID 10/04/19 [History Confirmed ]


Hydrochlorothiazide TAB* [Hydrodiuril TAB*] 25 mg PO DAILY 10/04/19 [History 

Confirmed 20]


Divalproex DR TAB(*) [Depakote DR(*)] 500 mg PO DAILY 10/11/19 [History 

Confirmed 20]


Acetaminophen [Tylenol] 2 tab PO ONCE 20 [History Confirmed 20]


Hydrocodone/Acetaminophen [Norco 5-325 Tablet] 1 - 2 each PO Q8HR PRN #15 

tablet MDD 6 20 [Rx]











PMH/Surg Hx/FS Hx/Imm Hx


Previously Healthy: Yes





- Surgical History


Surgical History: Yes


Surgery Procedure, Year, and Place: d/c.  deviated septum.  hysterectomy May 

2019





- Family History


Known Family History: Positive: Cardiac Disease, Hypertension, Diabetes





- Social History


Lives: With Family


Alcohol Use: Occasionally


Substance Use Type: None


Smoking Status (MU): Never Smoked Tobacco


Have You Smoked in the Last Year: No





- Immunization History


Most Recent Influenza Vaccination: none





Review of Systems


All Other Systems Reviewed And Are Negative: Yes


Constitutional: Positive: Negative


Skin: Positive: Bruising - R leg, Other - abraison right knee


Eyes: Positive: Negative


ENT: Positive: Negative


Respiratory: Positive: Negative


Cardiovascular: Positive: Other - right rib pain


Genitourinary: Positive: Negative


Motor: Positive: Negative


Neurovascular: Positive: Negative


Musculoskeletal: Positive: Negative


Neurological/Mental Status: Positive: Negative


Psychological: Positive: Negative


Is Patient Immunocompromised?: No





Physical Exam





- Summary


Physical Exam Summary: 





 Vital Signs Reviewed: Yes


A+Ox3, no distress


Eyes: Conjunctiva Clear, IRVIN. EOM intact and full


ENT: Hearing grossly normal  TM x 2 clear, mmoist, uvula midline, no exudate, 

no erythema


Neck: Positive: Supple


Respiratory: Positive: No respiratory distress, No accessory muscle use + CTA 

throughout  no w/r +TTP right inferior ribs midaxillary to anterior inferior - 

no bruising, no crepitus


Cardiovascular: RRR  nl s1, s2  no m/r  CBT <2  sec


abd soft + BS nt/nd  no guarding, no distension


Musculoskeletal Exam: CASTAÑEDA x 4 without difficulty Strength Intact, ROM Intact


Neurological: Positive: Alert,  + sensation throughout


Psychological: Positive: Normal Response To evaluator


Skin: Positive: no rash, no ecchymosis, right knee - dime size abraison to 

patella - non suturable


Triage Information Reviewed: Yes


Vital Signs: 


 Initial Vital Signs











Temp  98.4 F   20 12:54


 


Pulse  108   20 12:54


 


Resp  18   20 12:54


 


BP  143/96   20 12:54


 


Pulse Ox  96   20 12:54














Diagnostics





- Radiology


  ** No standard instances


Radiology Interpretation Completed By: Radiologist - Patient Name:             

       VERONICA PARKS                                                        

                                                  Medical Record #: [id number] 

Ordering Physician: Michelle Sanchez MD                                           

                                                                            

Acct.#: [id number] :         1970                    Age: 50   Sex: 

F                                                                              

                  Location: URGENT CARE [facility] Exam Date: 20 1316    

                                                                               

                                                ADM Status: REG ER Order 

Information:                                               RIBS RT UNI W/PA CH 

MIN 3 VWS Accession Number:                                                

[id number] CPT:                                                             

30502 Indication: Right rib injury.  3 views of the right ribs demonstrates no 

fracture. No other bone or joint abnormality is identified.  IMPRESSION: No 

fracture of the right ribs is noted.   _________________________________________

___________________ <Electronically signed by Alice Almodovar MD in OV>  20 

1343 Dictated By: Alice Almodovar MD Dictated Date/Time: 20 1343 Transcribed 

Date/Time: 20 1343 Copy to:    CC:Alice Almodovar MD; Michelle Sanchez MD; 

Jos Jesus MD Imaging - [facility]                                      

                      Imaging - [facility]  [address]                                                                  [address] 

[place] [address] ph (

[phone])                                                                ph 

([phone])                                                                 

                ph ([phone])                         This report is only 

to be considered final once signed by the Provider(s) as displayed in the "<

Electronically Signed by >" field (s). Absence of a  signature indicates the 

report is in a draft status and still needs to be finalized. In the event this 

document was created by someone other than the  signing Provider, the 

individual initiating the document will be listed in the "Entered by:" or 

"Dictated by:" fields.                                                         

                                          1 of 1





Re-Evaluation





- Re-Evaluation


  ** First Eval


Comment: Reviewed xray with patient - no fx, no PTX.  will discharge.  

incentive spirometer with teaching





Minor Trauma Course/Dx





- Course


Course Of Treatment: 





Patient presents to urgent care for evaluation of pain along the right ribs.  

Patient had a mechanical slip and fall on her deck 2 days ago.  Patient states 

she slipped on the steps and landed on her follow-up arrangements with patient 

with a small abrasion to her right knee.  Patient also appeared on the right 

ribs.  Pain is worse with palpation deep breaths and movement.  Better at rest.

  Patient's been taking Tylenol with mild improvement.  Patient denies 

shortness of breath.  No chest pain.  No nausea or vomiting.  No abdominal 

pain.  On exam vital signs reviewed.  Patient with point tenderness along her 

ribs and her right mid axillary line right anterior rib.  Pain is worse with 

direct palpation.  No crepitus or ecchymosis.  Lung sounds are clear.  We'll do 

a chest x-ray.  Discussed with patient if negative incentive spirometer, 

medical for nighttime is keeping her awake when she rolls over, lidocaine patch

, heat and stretch.  Patient comfortable.  Additionally, patient does have an 

abrasion to her right knee.  Observation tetanus.  Patient states she believes 

her tetanus was last 10 years and declined having a repeat tetanus booster 

today.  Return precautions discussed.  Care discussed.  Patient comfortable.





- Differential Dx/Diagnosis


Provider Diagnosis: 


 Rib pain on right side, Abrasion








Discharge ED





- Sign-Out/Discharge


Documenting (check all that apply): Patient Departure


All imaging exams completed and their final reports reviewed: Yes





- Discharge Plan


Condition: Stable


Disposition: HOME


Prescriptions: 


Hydrocodone/Acetaminophen [Norco 5-325 Tablet] 1 - 2 each PO Q8HR PRN #15 

tablet MDD 6


 PRN Reason: severe pain


Patient Education Materials:  Abrasion (ED), Rib Contusion (ED)


Referrals: 


Jos Jesus MD [Primary Care Provider] - 


Additional Instructions: 


- Hold a pillow, blanket or towel against your ris to provide support  -  it is 

VERY important you take deep, slow breaths several times an hour to prevent the 

development of pneumonia 


-apply ice (20 min at a time) every 2-3 hours for the next 2 days 


- Slow, gentle stretching exercises of your leg and upper body are important


- Okay to take tylenol product (tylenol OR Norco)  every 3hours as needed for 

pain. Take with food. Do NOT take for more than 4-5 days. Do NOT drive, operate 

machinery or drink alcohol while taking Norco. This medication may cause 

constipation - use a stool softner as needed 


- Keep your knee would clean - monitor for signs of infection - reddness, red 

streaking, odor - If you have any any concerns you should be rechecked


-Arrange a follow-up appointment with your doctor later this week or early next 

week - call your doctor or return with questions or concerns





- Billing Disposition and Condition


Condition: STABLE


Disposition: Home
